# Patient Record
Sex: FEMALE | Race: WHITE | Employment: FULL TIME | URBAN - METROPOLITAN AREA
[De-identification: names, ages, dates, MRNs, and addresses within clinical notes are randomized per-mention and may not be internally consistent; named-entity substitution may affect disease eponyms.]

---

## 2021-02-24 ENCOUNTER — THERAPY VISIT (OUTPATIENT)
Dept: PHYSICAL THERAPY | Facility: CLINIC | Age: 61
End: 2021-02-24
Payer: OTHER MISCELLANEOUS

## 2021-02-24 DIAGNOSIS — M54.41 ACUTE RIGHT-SIDED LOW BACK PAIN WITH RIGHT-SIDED SCIATICA: ICD-10-CM

## 2021-02-24 PROCEDURE — 97161 PT EVAL LOW COMPLEX 20 MIN: CPT | Mod: GP | Performed by: PHYSICAL THERAPIST

## 2021-02-24 PROCEDURE — 97110 THERAPEUTIC EXERCISES: CPT | Mod: GP | Performed by: PHYSICAL THERAPIST

## 2021-02-24 PROCEDURE — 97112 NEUROMUSCULAR REEDUCATION: CPT | Mod: GP | Performed by: PHYSICAL THERAPIST

## 2021-02-24 NOTE — PROGRESS NOTES
[facility] Initial Evaluation  Subjective:    Patient Health History  Roopa Moon being seen for R side low back and leg pain.     Problem began: 2/11/2021.   Problem occurred: At work bending and putting away large tray   Pain is reported as 7/10 on pain scale.  General health as reported by patient is good.  Pertinent medical history includes: depression.   Red flags:  None as reported by patient.  Medical allergies: none.   Surgeries include:  Orthopedic surgery.    Current medications:  Anti-depressants.    Current occupation is Material control handler.   Primary job tasks include:  Lifting/carrying, prolonged standing, pushing/pulling and repetitive tasks.                  Therapist Generated HPI Evaluation  Problem details: Pt presents to clinic with complaints of R sided low back pain/R sciatica starting on 2/11/2021 after bending at work to place large tray away. Pt felt sharp pain upon bending and noticed worsening pain the following 2 days. Pt continues to have increased pain with prolonged standing and walking. Pt is unable to lift at this time. Pt had MD appointment on 2/22/2021 and referred to PT. .         Type of problem:  Lumbar.    This is a new condition.  Condition occurred with:  Bending and lifting.  Where condition occurred: at work.  Patient reports pain:  Lumbar spine right.  Pain is described as sharp and is intermittent.  Pain radiates to:  Gluteals right and knee right. Pain is the same all the time (activity dependent).  Since onset symptoms are unchanged.  Associated symptoms:  Loss of motion/stiffness. Symptoms are exacerbated by bending, carrying, lifting, walking, standing, sitting and lying down  and relieved by NSAID's and rest.  Imaging testing: none.  Past treatment: none. There was none improvement following previous treatment.  Restrictions due to condition include:  Currently not working due to present treatment.  Barriers include:  None as reported  by patient.                        Objective:  Standing Alignment:    Cervical/Thoracic:  Forward head  Shoulder/UE:  Rounded shoulders                  Flexibility/Screens:       Lower Extremity:  Decreased left lower extremity flexibility:Hamstrings    Decreased right lower extremity flexibility:  Piriformis and Hamstrings               Lumbar/SI Evaluation  ROM:    AROM Lumbar:   Flexion:            To ankle -pain  Ext:                    75%   Side Bend:        Left:  WNL    Right:  75% slight pain  Rotation:           Left:  WNL    Right:  WNL  Side Glide:        Left:     Right:         Strength: poor abdominal strength, glute max 4/5 R, 4+/5 L  Lumbar Myotomes:  normal                Lumbar Dermtomes:  normal                Neural Tension/Mobility:      Left side:SLR; Femoral Nerve or Slump  negative.     Right side:   Femoral Nerve; Slump or SLR  negative.   Lumbar Palpation:      Tenderness not present at Left:    Erector Spinae; Piriformis or PSIS  Tenderness present at Right: Erector Spinae; Piriformis and PSIS    Lumbar Provocation:        Right negative with:  Stork w/ext; Mobility and PROM hip  Spinal Segmental Conclusions:     Level: Hypo noted at L5 and L4                                                   General     ROS    Assessment/Plan:    Patient is a 60 year old female with lumbar complaints.    Patient has the following significant findings with corresponding treatment plan.                Diagnosis 1:  R sided low back pain  Pain -  hot/cold therapy, manual therapy, self management, education and home program  Decreased ROM/flexibility - manual therapy, therapeutic exercise, therapeutic activity and home program  Decreased strength - therapeutic exercise, therapeutic activities and home program  Impaired muscle performance - neuro re-education and home program  Decreased function - therapeutic activities and home program  Impaired posture - neuro re-education, therapeutic activities and home  program    Therapy Evaluation Codes:   1) History comprised of:   Personal factors that impact the plan of care:      None.    Comorbidity factors that impact the plan of care are:      Depression.     Medications impacting care: Anti-depressant.  2) Examination of Body Systems comprised of:   Body structures and functions that impact the plan of care:      Lumbar spine.   Activity limitations that impact the plan of care are:      Bending, Lifting, Sitting, Stairs, Standing and Walking.  3) Clinical presentation characteristics are:   Stable/Uncomplicated.  4) Decision-Making    Low complexity using standardized patient assessment instrument and/or measureable assessment of functional outcome.  Cumulative Therapy Evaluation is: Low complexity.    Previous and current functional limitations:  (See Goal Flow Sheet for this information)    Short term and Long term goals: (See Goal Flow Sheet for this information)     Communication ability:  Patient appears to be able to clearly communicate and understand verbal and written communication and follow directions correctly.  Treatment Explanation - The following has been discussed with the patient:   RX ordered/plan of care  Anticipated outcomes  Possible risks and side effects  This patient would benefit from PT intervention to resume normal activities.   Rehab potential is good.    Frequency:  1 X week, once daily  Duration:  for 6 weeks  Discharge Plan:  Achieve all LTG.  Independent in home treatment program.  Return to previous functional level by discharge.  Reach maximal therapeutic benefit.    Please refer to the daily flowsheet for treatment today, total treatment time and time spent performing 1:1 timed codes.

## 2021-02-24 NOTE — LETTER
[facility] PHYSICAL THERAPY  [address] [facility]  [place] [id number] [phone]    2021    Re: Roopa Moon   :   1960  MRN:  [id number]   REFERRING PHYSICIAN: HENRI CHAVEZ MD    [facility]    Date of Initial Evaluation:  2021  Visits:  Rxs Used: 1  Reason for Referral:  Acute right-sided low back pain with right-sided sciatica    EVALUATION SUMMARY    [facility] Initial Evaluation  Subjective:    Patient Health History  Roopa Moon being seen for R side low back and leg pain.   Problem began: 2021.   Problem occurred: At work bending and putting away large tray   Pain is reported as 7/10 on pain scale.  General health as reported by patient is good.  Pertinent medical history includes: depression.   Red flags:  None as reported by patient.  Medical allergies: none.   Surgeries include:  Orthopedic surgery.    Current medications:  Anti-depressants.    Current occupation is Material control handler.   Primary job tasks include:  Lifting/carrying, prolonged standing, pushing/pulling and repetitive tasks.              Therapist Generated HPI Evaluation  Problem details: Pt presents to clinic with complaints of R sided low back pain/R sciatica starting on 2021 after bending at work to place large tray away. Pt felt sharp pain upon bending and noticed worsening pain the following 2 days. Pt continues to have increased pain with prolonged standing and walking. Pt is unable to lift at this time. Pt had MD appointment on 2021 and referred to PT.        Type of problem:  Lumbar.  This is a new condition.  Condition occurred with:  Bending and lifting.  Where condition occurred: at work.  Patient reports pain:  Lumbar spine right.  Pain is described as sharp and is intermittent.  Pain radiates to:  Gluteals right and knee right. Pain is the same all  the time (activity dependent).  Since onset symptoms are unchanged.  Associated symptoms:  Loss of motion/stiffness. Symptoms are exacerbated by bending, carrying, lifting, walking, standing, sitting and lying down  and relieved by NSAID's and rest.  Imaging testing: none.  Past treatment: none. There was none improvement following previous treatment.  Restrictions due to condition include:  Currently not working due to present treatment.  Barriers include:  None as reported by patient.  Re: Roopa Moon   :   1960      Objective:  Standing Alignment:    Cervical/Thoracic:  Forward head  Shoulder/UE:  Rounded shoulders  Flexibility/Screens:   Lower Extremity:  Decreased left lower extremity flexibility:Hamstrings  Decreased right lower extremity flexibility:  Piriformis and Hamstrings       Lumbar/SI Evaluation  AROM Lumbar:   Flexion:            To ankle -pain  Ext:                    75%   Side Bend:        Left:  WNL    Right:  75% slight pain  Rotation:           Left:  WNL    Right:  WNL  Side Glide:        Left:     Right:      Strength: poor abdominal strength, glute max 4/5 R, 4+/5 L  Lumbar Myotomes:  normal  Lumbar Dermtomes:  normal  Neural Tension/Mobility:    Left side:SLR; Femoral Nerve or Slump  negative.   Right side:   Femoral Nerve; Slump or SLR  negative.   Lumbar Palpation:    Tenderness not present at Left:    Erector Spinae; Piriformis or PSIS  Tenderness present at Right: Erector Spinae; Piriformis and PSIS  Lumbar Provocation:    Right negative with:  Stork w/ext; Mobility and PROM hip  Spinal Segmental Conclusions:   Level: Hypo noted at L5 and L4    Assessment/Plan:    Patient is a 60 year old female with lumbar complaints.    Patient has the following significant findings with corresponding treatment plan.                Diagnosis 1:  R sided low back pain  Pain -  hot/cold therapy, manual therapy, self management, education and home program  Decreased ROM/flexibility - manual  therapy, therapeutic exercise, therapeutic activity and home program  Decreased strength - therapeutic exercise, therapeutic activities and home program  Impaired muscle performance - neuro re-education and home program  Decreased function - therapeutic activities and home program  Impaired posture - neuro re-education, therapeutic activities and home program    Therapy Evaluation Codes:   1) History comprised of:   Personal factors that impact the plan of care:      None.    Comorbidity factors that impact the plan of care are:      Depression.     Medications impacting care: Anti-depressant.  2) Examination of Body Systems comprised of:   Body structures and functions that impact the plan of care:      Lumbar spine.   Activity limitations that impact the plan of care are:      Bending, Lifting, Sitting, Stairs, Standing and Walking.    Re: Roopa Moon   :   1960        3) Clinical presentation characteristics are:   Stable/Uncomplicated.  4) Decision-Making    Low complexity using standardized patient assessment instrument and/or measureable assessment of functional outcome.  Cumulative Therapy Evaluation is: Low complexity.  Previous and current functional limitations:  (See Goal Flow Sheet for this information)    Short term and Long term goals: (See Goal Flow Sheet for this information)   Communication ability:  Patient appears to be able to clearly communicate and understand verbal and written communication and follow directions correctly.  Treatment Explanation - The following has been discussed with the patient:   RX ordered/plan of care  Anticipated outcomes  Possible risks and side effects  This patient would benefit from PT intervention to resume normal activities.   Rehab potential is good.  Frequency:  1 X week, once daily  Duration:  for 6 weeks  Discharge Plan:  Achieve all LTG.  Independent in home treatment program.  Return to previous functional level by discharge.  Reach maximal  therapeutic benefit.    Thank you for your referral.      INQUIRIES  Therapist: Ron Falcon DPT   [facility] PHYSICAL THERAPY  [id number] [place] [address]. [id number]  [facility] [address]  Phone: [phone]  Fax: [phone]

## 2021-02-26 ENCOUNTER — THERAPY VISIT (OUTPATIENT)
Dept: PHYSICAL THERAPY | Facility: CLINIC | Age: 61
End: 2021-02-26
Payer: OTHER MISCELLANEOUS

## 2021-02-26 DIAGNOSIS — M54.41 ACUTE RIGHT-SIDED LOW BACK PAIN WITH RIGHT-SIDED SCIATICA: ICD-10-CM

## 2021-02-26 PROCEDURE — 97112 NEUROMUSCULAR REEDUCATION: CPT | Mod: GP | Performed by: PHYSICAL THERAPIST

## 2021-02-26 PROCEDURE — 97110 THERAPEUTIC EXERCISES: CPT | Mod: GP | Performed by: PHYSICAL THERAPIST

## 2021-03-03 ENCOUNTER — THERAPY VISIT (OUTPATIENT)
Dept: PHYSICAL THERAPY | Facility: CLINIC | Age: 61
End: 2021-03-03
Payer: OTHER MISCELLANEOUS

## 2021-03-03 DIAGNOSIS — M54.41 ACUTE RIGHT-SIDED LOW BACK PAIN WITH RIGHT-SIDED SCIATICA: ICD-10-CM

## 2021-03-03 PROCEDURE — 97112 NEUROMUSCULAR REEDUCATION: CPT | Mod: GP | Performed by: PHYSICAL THERAPIST

## 2021-03-03 PROCEDURE — 97110 THERAPEUTIC EXERCISES: CPT | Mod: GP | Performed by: PHYSICAL THERAPIST

## 2021-03-05 ENCOUNTER — THERAPY VISIT (OUTPATIENT)
Dept: PHYSICAL THERAPY | Facility: CLINIC | Age: 61
End: 2021-03-05
Payer: OTHER MISCELLANEOUS

## 2021-03-05 DIAGNOSIS — M54.41 ACUTE RIGHT-SIDED LOW BACK PAIN WITH RIGHT-SIDED SCIATICA: ICD-10-CM

## 2021-03-05 PROCEDURE — 97112 NEUROMUSCULAR REEDUCATION: CPT | Mod: GP | Performed by: PHYSICAL THERAPIST

## 2021-03-05 PROCEDURE — 97110 THERAPEUTIC EXERCISES: CPT | Mod: GP | Performed by: PHYSICAL THERAPIST

## 2021-04-14 PROBLEM — M54.41 ACUTE RIGHT-SIDED LOW BACK PAIN WITH RIGHT-SIDED SCIATICA: Status: RESOLVED | Noted: 2021-02-24 | Resolved: 2021-04-14

## 2021-04-14 NOTE — PROGRESS NOTES
Discharge Note    Progress reporting period is from initial evaluation date (please see noted date below) to Mar 5, 2021.  Linked Episodes   Type: Episode: Status: Noted: Resolved: Last update: Updated by:   PHYSICAL THERAPY Low back 2/24/2021 Active 2/24/2021  3/5/2021 10:01 AM Ron Falcon PT      Comments:       Roopa failed to follow up and current status is unknown.  Please see information below for last relevant information on current status.  Patient seen for 4 visits.    SUBJECTIVE  Subjective changes noted by patient:  Roopa reports noticing slight increase in pain/stiffness yesterday for unknown reasons.   .  Current pain level is 4/10.     Previous pain level was  7/10.   Changes in function:  Yes (See Goal flowsheet attached for changes in current functional level)  Adverse reaction to treatment or activity: None    OBJECTIVE  Changes noted in objective findings:       ASSESSMENT/PLAN  Diagnosis: R sided low back pain, sciatica   Updated problem list and treatment plan:   Pain - HEP  Decreased ROM/flexibility - HEP  Decreased strength - HEP  Impaired muscle performance - HEP  Impaired posture - HEP  STG/LTGs have been met or progress has been made towards goals:  Yes, please see goal flowsheet for most current information  Assessment of Progress: current status is unknown.    Last current status:     Self Management Plans:  HEP  I have re-evaluated this patient and find that the nature, scope, duration and intensity of the therapy is appropriate for the medical condition of the patient.  Roopa continues to require the following intervention to meet STG and LTG's:  HEP.    Recommendations:  Discharge with current home program.  Patient to follow up with MD as needed.    Please refer to the daily flowsheet for treatment today, total treatment time and time spent performing 1:1 timed codes.